# Patient Record
Sex: FEMALE | Race: ASIAN | NOT HISPANIC OR LATINO | Employment: STUDENT | URBAN - METROPOLITAN AREA
[De-identification: names, ages, dates, MRNs, and addresses within clinical notes are randomized per-mention and may not be internally consistent; named-entity substitution may affect disease eponyms.]

---
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
## 2022-01-17 PROBLEM — M41.9 SCOLIOSIS: Status: RESOLVED | Noted: 2021-11-11 | Resolved: 2022-01-17

## 2022-05-24 ENCOUNTER — APPOINTMENT (OUTPATIENT)
Dept: DERMATOLOGY | Age: 22
End: 2022-05-24

## 2022-05-24 ENCOUNTER — OFFICE VISIT (OUTPATIENT)
Dept: DERMATOLOGY | Age: 22
End: 2022-05-24

## 2022-05-24 DIAGNOSIS — B07.8 OTHER VIRAL WARTS: Primary | ICD-10-CM

## 2022-05-24 PROCEDURE — 99202 OFFICE O/P NEW SF 15 MIN: CPT | Performed by: NURSE PRACTITIONER

## 2022-05-24 PROCEDURE — 17110 DESTRUCTION B9 LES UP TO 14: CPT | Performed by: NURSE PRACTITIONER

## 2022-12-16 ENCOUNTER — NURSE TRIAGE (OUTPATIENT)
Dept: NURSING | Facility: CLINIC | Age: 22
End: 2022-12-16

## 2022-12-17 NOTE — TELEPHONE ENCOUNTER
"Pt reports \"rock climbing today, happened around 7:30 pm, fell between eight to 10 feet, landed on feet and knees, pinched my back a little bit, neck was a little sore, now neck stiffness\". Pt reports mild pain only and denies bruising or other injuries.     Advised pt to be seen in the ER within the next four hours per protocol.     Pt verbalizes understanding of recommendation to be seen tonight, states she will wait and see how she feels, may go in the morning.     Reason for Disposition    1] MILD or MODERATE neck pain AND [2] fall from 3 feet (1 meter) or 5 stairs, or higher    Additional Information    Negative: Dangerous mechanism of injury (e.g., MVA, contact sports, diving, fall on trampoline, fall > 10 feet or 3 meters)  (Exception: Neck pain began > 1 hour after injury.)    Negative: Weakness of arms or legs    Negative: Numbness, tingling, or burning of arms, upper back/chest or legs (Exception: brief, now gone)    Negative: Major bleeding (e.g., actively dripping or spurting)    Negative: Bullet wound, knife wound, or other penetrating object    Negative: Difficulty breathing (e.g., choking or stridor)    Negative: Knocked out (unconscious from head injury) > 1 minute    Negative: [1] Direct blow to front of neck AND [2] cough, hoarseness, abnormal voice, or can't talk    Negative: Sounds like a life-threatening emergency to the triager    Negative: [1] Injuries at more than 1 site AND [2] unsure which guideline to use    Negative: Neck pain not from an injury    Negative: [1] Dangerous mechanism of injury (e.g., MVA, contact sports, diving, fall on trampoline, fall > 10 feet or 3 meters) AND [2] neck pain or stiffness began > 1 hour after injury    Negative: [1] Numbness, tingling, or burning of arms, upper back/chest or legs AND [2] not present now (i.e., completely resolved)    Negative: Coughing up blood    Negative: Difficulty swallowing or drooling    Negative: Skin is split open or gaping (or " length > 1/2 inch or 12 mm)    Negative: Puncture wound of neck    Negative: [1] Bleeding AND [2] won't stop after 10 minutes of direct pressure (using correct technique)    Negative: Large swelling or bruise > 2 inches (5 cm)    Negative: Sounds like a serious injury to the triager    Protocols used: NECK INJURY-A-AH

## 2023-01-09 ENCOUNTER — OFFICE VISIT (OUTPATIENT)
Dept: FAMILY MEDICINE | Age: 23
End: 2023-01-09

## 2023-01-09 VITALS
SYSTOLIC BLOOD PRESSURE: 111 MMHG | TEMPERATURE: 97.9 F | RESPIRATION RATE: 12 BRPM | BODY MASS INDEX: 16.71 KG/M2 | HEIGHT: 62 IN | HEART RATE: 88 BPM | WEIGHT: 90.8 LBS | DIASTOLIC BLOOD PRESSURE: 68 MMHG

## 2023-01-09 DIAGNOSIS — B07.8 PERIUNGUAL WART: ICD-10-CM

## 2023-01-09 DIAGNOSIS — Z00.01 ENCOUNTER FOR ROUTINE ADULT HEALTH EXAMINATION WITH ABNORMAL FINDINGS: Primary | ICD-10-CM

## 2023-01-09 DIAGNOSIS — M41.115 JUVENILE IDIOPATHIC SCOLIOSIS OF THORACOLUMBAR REGION: ICD-10-CM

## 2023-01-09 DIAGNOSIS — F33.1 MODERATE RECURRENT MAJOR DEPRESSION (CMD): ICD-10-CM

## 2023-01-09 PROCEDURE — 99395 PREV VISIT EST AGE 18-39: CPT | Performed by: FAMILY MEDICINE

## 2023-01-09 PROCEDURE — 17110 DESTRUCTION B9 LES UP TO 14: CPT | Performed by: FAMILY MEDICINE

## 2023-01-09 RX ORDER — MELOXICAM 7.5 MG/1
7.5-15 TABLET ORAL DAILY PRN
Qty: 30 TABLET | Refills: 2 | Status: SHIPPED | OUTPATIENT
Start: 2023-01-09 | End: 2023-02-08

## 2023-01-09 ASSESSMENT — PATIENT HEALTH QUESTIONNAIRE - PHQ9
1. LITTLE INTEREST OR PLEASURE IN DOING THINGS: NOT AT ALL
2. FEELING DOWN, DEPRESSED OR HOPELESS: SEVERAL DAYS
SUM OF ALL RESPONSES TO PHQ9 QUESTIONS 1 AND 2: 1
SUM OF ALL RESPONSES TO PHQ9 QUESTIONS 1 AND 2: 1
CLINICAL INTERPRETATION OF PHQ2 SCORE: NO FURTHER SCREENING NEEDED

## 2023-01-14 ENCOUNTER — HEALTH MAINTENANCE LETTER (OUTPATIENT)
Age: 23
End: 2023-01-14

## 2023-09-28 ENCOUNTER — NURSE TRIAGE (OUTPATIENT)
Dept: NURSING | Facility: CLINIC | Age: 23
End: 2023-09-28
Payer: COMMERCIAL

## 2023-09-29 NOTE — TELEPHONE ENCOUNTER
"Viki reports a Bump on her outer Rt Vulva     - ~1/4\" size  - First noted ~2 days ago  - Tried to pop it  - Tender to touch  - Located where hair grows - Does NOT shave where the bump is.  - Wears leggings but \"not excessively tight\"    Advised to see PCP within 24 hours  Care Advice reviewed    Yulisa Blanco RN  [facility]      Reason for Disposition   [1] Rash (e.g., redness, tiny bumps, sore) of genital area AND [2] present > 24 hours    Additional Information   Negative: Patient sounds very sick or weak to the triager   Negative: [1] SEVERE pain AND [2] not improved 2 hours after pain medicine   Negative: [1] Genital area looks infected (e.g., draining sore, spreading redness) AND [2] fever   Negative: MODERATE-SEVERE itching (i.e., interferes with school, work, or sleep)   Negative: Genital area looks infected (e.g., draining sore, spreading redness)   Negative: Rash with painful tiny water blisters    Protocols used: Vulvar Symptoms-A-AH    "
none

## 2024-02-11 ENCOUNTER — HEALTH MAINTENANCE LETTER (OUTPATIENT)
Age: 24
End: 2024-02-11

## 2024-05-22 ENCOUNTER — OFFICE VISIT (OUTPATIENT)
Dept: FAMILY MEDICINE | Age: 24
End: 2024-05-22

## 2024-05-22 VITALS
HEIGHT: 62 IN | TEMPERATURE: 99 F | SYSTOLIC BLOOD PRESSURE: 100 MMHG | HEART RATE: 78 BPM | RESPIRATION RATE: 14 BRPM | DIASTOLIC BLOOD PRESSURE: 69 MMHG | BODY MASS INDEX: 16.93 KG/M2 | OXYGEN SATURATION: 99 % | WEIGHT: 92 LBS

## 2024-05-22 DIAGNOSIS — R11.0 NAUSEA: ICD-10-CM

## 2024-05-22 DIAGNOSIS — M41.115 JUVENILE IDIOPATHIC SCOLIOSIS OF THORACOLUMBAR REGION: ICD-10-CM

## 2024-05-22 DIAGNOSIS — R14.0 ABDOMINAL BLOATING: Primary | ICD-10-CM

## 2024-05-22 DIAGNOSIS — R19.7 ACUTE DIARRHEA: ICD-10-CM

## 2024-05-22 PROCEDURE — 99213 OFFICE O/P EST LOW 20 MIN: CPT | Performed by: FAMILY MEDICINE

## 2024-05-22 RX ORDER — MELOXICAM 7.5 MG/1
7.5-15 TABLET ORAL DAILY PRN
Qty: 30 TABLET | Refills: 0 | Status: SHIPPED | OUTPATIENT
Start: 2024-05-22 | End: 2024-06-21

## 2024-05-22 RX ORDER — OMEPRAZOLE 20 MG/1
20 CAPSULE, DELAYED RELEASE ORAL DAILY
Qty: 30 CAPSULE | Refills: 0 | Status: SHIPPED | OUTPATIENT
Start: 2024-05-22

## 2024-05-22 RX ORDER — VALACYCLOVIR HYDROCHLORIDE 1 G/1
2 TABLET, FILM COATED ORAL DAILY
COMMUNITY
Start: 2024-03-26

## 2024-05-22 ASSESSMENT — PATIENT HEALTH QUESTIONNAIRE - PHQ9: 2. FEELING DOWN, DEPRESSED OR HOPELESS: NOT AT ALL

## 2024-06-30 DIAGNOSIS — R11.0 NAUSEA: ICD-10-CM

## 2024-07-01 RX ORDER — OMEPRAZOLE 20 MG/1
20 CAPSULE, DELAYED RELEASE ORAL DAILY
Qty: 30 CAPSULE | Refills: 1 | Status: SHIPPED | OUTPATIENT
Start: 2024-07-01